# Patient Record
Sex: MALE | Race: WHITE | NOT HISPANIC OR LATINO | URBAN - METROPOLITAN AREA
[De-identification: names, ages, dates, MRNs, and addresses within clinical notes are randomized per-mention and may not be internally consistent; named-entity substitution may affect disease eponyms.]

---

## 2021-09-20 ENCOUNTER — EMERGENCY (EMERGENCY)
Facility: HOSPITAL | Age: 61
LOS: 0 days | Discharge: HOME | End: 2021-09-21
Attending: STUDENT IN AN ORGANIZED HEALTH CARE EDUCATION/TRAINING PROGRAM | Admitting: STUDENT IN AN ORGANIZED HEALTH CARE EDUCATION/TRAINING PROGRAM
Payer: MEDICAID

## 2021-09-20 VITALS
HEART RATE: 68 BPM | HEIGHT: 73 IN | OXYGEN SATURATION: 100 % | SYSTOLIC BLOOD PRESSURE: 190 MMHG | DIASTOLIC BLOOD PRESSURE: 81 MMHG | RESPIRATION RATE: 20 BRPM | TEMPERATURE: 98 F | WEIGHT: 203.93 LBS

## 2021-09-20 DIAGNOSIS — R06.02 SHORTNESS OF BREATH: ICD-10-CM
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
## 2021-09-20 DIAGNOSIS — R07.81 PLEURODYNIA: ICD-10-CM

## 2021-09-20 DIAGNOSIS — E11.9 TYPE 2 DIABETES MELLITUS WITHOUT COMPLICATIONS: ICD-10-CM
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
## 2021-09-20 DIAGNOSIS — Z87.442 PERSONAL HISTORY OF URINARY CALCULI: ICD-10-CM

## 2021-09-20 PROCEDURE — 99284 EMERGENCY DEPT VISIT MOD MDM: CPT

## 2021-09-20 RX ORDER — ACETAMINOPHEN 500 MG
650 TABLET ORAL ONCE
Refills: 0 | Status: COMPLETED | OUTPATIENT
Start: 2021-09-20 | End: 2021-09-20

## 2021-09-20 RX ADMIN — Medication 650 MILLIGRAM(S): at 22:42

## 2021-09-20 NOTE — ED ADULT TRIAGE NOTE - CHIEF COMPLAINT QUOTE
BIBA with complaints of slipped and fell, hit left rib area on the chair, did not hit head, no blood thinners

## 2021-09-21 PROCEDURE — 71250 CT THORAX DX C-: CPT | Mod: 26,MA

## 2021-09-21 PROCEDURE — 71045 X-RAY EXAM CHEST 1 VIEW: CPT | Mod: 26

## 2021-09-21 NOTE — ED PROVIDER NOTE - PHYSICAL EXAMINATION
CONSTITUTIONAL: Well-developed; well-nourished; in no acute distress. In pain, laying on the bed.  SKIN: warm, dry  HEAD: Normocephalic; atraumatic.  EYES: no conjunctival injection. PERRL.   ENT: No nasal discharge; airway clear.  NECK: Supple; non tender.  CARD: S1, S2 normal; no murmurs, gallops, or rubs. Regular rate and rhythm.   RESP: No wheezes, rales or rhonchi.  CHEST: Focal tenderness in left lower part of chest, no ecchymosis seen, no redness  ABD: soft ntnd  EXT: Normal ROM.  No clubbing, cyanosis or edema.   LYMPH: No acute cervical adenopathy.  NEURO: Alert, oriented, grossly unremarkable  PSYCH: Cooperative, appropriate.

## 2021-09-21 NOTE — ED PROVIDER NOTE - NS ED ROS FT
Review of Systems:  •	CONSTITUTIONAL - No fever, No diaphoresis, No weight change  •	SKIN - No rash  •	HEMATOLOGIC - No abnormal bleeding or bruising  •	EYES - No eye pain, No blurred vision  •	ENT - No change in hearing, No sore throat, No neck pain, No rhinorrhea, No ear pain  •	RESPIRATORY - +shortness of breath, No cough  •	CARDIAC -+ chest pain, No palpitations  •	GI - No abdominal pain, No nausea, No vomiting, No diarrhea, No constipation, No bright red blood per rectum or melena. No flank pain  •                 - No dysuria, frequency, hematuria.   •	ENDO - No polydypsia, No polyuria, No heat/cold intolerance  •	MUSCULOSKELETAL - No joint paint, No swelling, No back pain  •	NEUROLOGIC - No numbness, No focal weakness, No headache, No dizziness  All other systems negative, unless specified in HPI

## 2021-09-21 NOTE — ED PROVIDER NOTE - ATTENDING CONTRIBUTION TO CARE
60 yo m hx DM, kidney stones, no AC  pt states 2 hrs pta, he was trying to change lightbulb. pt was on a chair and as he was going down, he fell onto the back of the chair. pt did not fall off the chair to the ground. no head/neck injury. pt c/o pain to L anterior lower chest. pain worse w/ palpation and deep inspiration.  no preceding cp nor sob.      vss  gen- NAD, aaox3  card-rrr  Chest- L lower anterior chest w/ focal tenderness  lungs-ctab, no wheezing or rhonchi  abd-sntnd, no guarding or rebound  neuro- full str/sensation, cn ii-xii grossly intact, normal coordination and gait    cxr to r/o ptx, ct chest to eval for rib fx   will provide supportive care, serial exam and ED observation period

## 2021-09-21 NOTE — ED PROVIDER NOTE - CLINICAL SUMMARY MEDICAL DECISION MAKING FREE TEXT BOX
CT negative for fx. likely contusion. will rec pain control/nsaids, focus on deep inspiration, outpt f/u, return precautions

## 2021-09-21 NOTE — ED PROVIDER NOTE - PROGRESS NOTE DETAILS
MQ: CT shows no fracture, discussed incidental finding of pulmonary nodule to f/u with PMD for repeat CT chest in 1 year, pain slightly improved, advised OTC tylenol or ibuprofen for pain, will d/c

## 2021-09-21 NOTE — ED PROVIDER NOTE - OBJECTIVE STATEMENT
The patient is a 60 yo male with PMHx of DM and kidney stones c/o left rib pain since 2 hours ago. Patient was on a chair, changing the lightbulb of a chandelier, tried to get down, missed a step, and hit the left side of his chest on the top of the back of the chair. He had sudden onset, moderate, left lower chest pain, nonradiating, constant, worse with taking a deep breath. Associated SOB due to pain. Denies fall, head trauma, LOC, neck pain, back pain, blood thinner use. Denies fever, chills, cough, abdominal pain, n/v/d.

## 2021-09-21 NOTE — ED PROVIDER NOTE - PATIENT PORTAL LINK FT
You can access the FollowMyHealth Patient Portal offered by [facility] by registering at the following website: [email or web address]. By joining [employer]’s FollowMyHealth portal, you will also be able to view your health information using other applications (apps) compatible with our system.

## 2021-09-21 NOTE — ED PROVIDER NOTE - NSFOLLOWUPINSTRUCTIONS_ED_ALL_ED_FT
Chest Wall Pain    Chest wall pain is pain in or around the bones and muscles of your chest. Chest wall pain may be caused by:  •An injury.      •Coughing a lot.      •Using your chest and arm muscles too much.      Sometimes, the cause may not be known. This pain may take a few weeks or longer to get better.      Follow these instructions at home:      Managing pain, stiffness, and swelling   If told, put ice on the painful area:  •Put ice in a plastic bag.      •Place a towel between your skin and the bag.      •Leave the ice on for 20 minutes, 2–3 times a day.      Activity     •Rest as told by your doctor.      •Avoid doing things that cause pain. This includes lifting heavy items.      •Ask your doctor what activities are safe for you.        General instructions      •Take over-the-counter and prescription medicines only as told by your doctor.      • Do not use any products that contain nicotine or tobacco, such as cigarettes, e-cigarettes, and chewing tobacco. If you need help quitting, ask your doctor.      •Keep all follow-up visits as told by your doctor. This is important.        Contact a doctor if:    •You have a fever.      •Your chest pain gets worse.      •You have new symptoms.        Get help right away if:    •You feel sick to your stomach (nauseous) or you throw up (vomit).      •You feel sweaty or light-headed.      •You have a cough with mucus from your lungs (sputum) or you cough up blood.      •You are short of breath.      These symptoms may be an emergency. Do not wait to see if the symptoms will go away. Get medical help right away. Call your local emergency services (911 in the [place] Do not drive yourself to the hospital.       Summary    •Chest wall pain is pain in or around the bones and muscles of your chest.      •It may be treated with ice, rest, and medicines. Your condition may also get better if you avoid doing things that cause pain.      •Contact a doctor if you have a fever, chest pain that gets worse, or new symptoms.      •Get help right away if you feel light-headed or you get short of breath. These symptoms may be an emergency.      This information is not intended to replace advice given to you by your health care provider. Make sure you discuss any questions you have with your health care provider.    Please follow up with your primary care doctor in the next couple of days.

## 2022-04-18 PROBLEM — N20.0 CALCULUS OF KIDNEY: Chronic | Status: ACTIVE | Noted: 2021-09-21

## 2022-04-18 PROBLEM — E11.9 TYPE 2 DIABETES MELLITUS WITHOUT COMPLICATIONS: Chronic | Status: ACTIVE | Noted: 2021-09-21

## 2022-07-11 PROBLEM — Z00.00 ENCOUNTER FOR PREVENTIVE HEALTH EXAMINATION: Status: ACTIVE | Noted: 2022-07-11

## 2022-07-14 ENCOUNTER — APPOINTMENT (OUTPATIENT)
Dept: ENDOCRINOLOGY | Facility: CLINIC | Age: 62
End: 2022-07-14

## 2024-07-22 ENCOUNTER — EMERGENCY (EMERGENCY)
Facility: HOSPITAL | Age: 64
LOS: 0 days | Discharge: HOME | End: 2024-07-22
Attending: EMERGENCY MEDICINE
Payer: COMMERCIAL

## 2024-07-22 VITALS
DIASTOLIC BLOOD PRESSURE: 83 MMHG | WEIGHT: 209 LBS | SYSTOLIC BLOOD PRESSURE: 168 MMHG | HEIGHT: 73 IN | HEART RATE: 71 BPM | RESPIRATION RATE: 19 BRPM | OXYGEN SATURATION: 98 % | TEMPERATURE: 98 F

## 2024-07-22 VITALS — TEMPERATURE: 97 F

## 2024-07-22 DIAGNOSIS — M54.9 DORSALGIA, UNSPECIFIED: ICD-10-CM

## 2024-07-22 DIAGNOSIS — R10.9 UNSPECIFIED ABDOMINAL PAIN: ICD-10-CM

## 2024-07-22 DIAGNOSIS — Z88.0 ALLERGY STATUS TO PENICILLIN: ICD-10-CM

## 2024-07-22 DIAGNOSIS — Z87.442 PERSONAL HISTORY OF URINARY CALCULI: ICD-10-CM

## 2024-07-22 LAB
ALBUMIN SERPL ELPH-MCNC: 4.9 G/DL — SIGNIFICANT CHANGE UP (ref 3.5–5.2)
ALP SERPL-CCNC: 81 U/L — SIGNIFICANT CHANGE UP (ref 30–115)
ALT FLD-CCNC: 23 U/L — SIGNIFICANT CHANGE UP (ref 0–41)
ANION GAP SERPL CALC-SCNC: 13 MMOL/L — SIGNIFICANT CHANGE UP (ref 7–14)
APPEARANCE UR: CLEAR — SIGNIFICANT CHANGE UP
AST SERPL-CCNC: 31 U/L — SIGNIFICANT CHANGE UP (ref 0–41)
BASOPHILS # BLD AUTO: 0.03 K/UL — SIGNIFICANT CHANGE UP (ref 0–0.2)
BASOPHILS NFR BLD AUTO: 0.4 % — SIGNIFICANT CHANGE UP (ref 0–1)
BILIRUB DIRECT SERPL-MCNC: <0.2 MG/DL — SIGNIFICANT CHANGE UP (ref 0–0.3)
BILIRUB INDIRECT FLD-MCNC: >0.2 MG/DL — SIGNIFICANT CHANGE UP (ref 0.2–1.2)
BILIRUB SERPL-MCNC: 0.4 MG/DL — SIGNIFICANT CHANGE UP (ref 0.2–1.2)
BILIRUB UR-MCNC: NEGATIVE — SIGNIFICANT CHANGE UP
BUN SERPL-MCNC: 17 MG/DL — SIGNIFICANT CHANGE UP (ref 10–20)
CALCIUM SERPL-MCNC: 10.3 MG/DL — SIGNIFICANT CHANGE UP (ref 8.4–10.4)
CHLORIDE SERPL-SCNC: 101 MMOL/L — SIGNIFICANT CHANGE UP (ref 98–110)
CO2 SERPL-SCNC: 25 MMOL/L — SIGNIFICANT CHANGE UP (ref 17–32)
COLOR SPEC: YELLOW — SIGNIFICANT CHANGE UP
CREAT SERPL-MCNC: 0.9 MG/DL — SIGNIFICANT CHANGE UP (ref 0.7–1.5)
DIFF PNL FLD: NEGATIVE — SIGNIFICANT CHANGE UP
EGFR: 95 ML/MIN/1.73M2 — SIGNIFICANT CHANGE UP
EOSINOPHIL # BLD AUTO: 0.16 K/UL — SIGNIFICANT CHANGE UP (ref 0–0.7)
EOSINOPHIL NFR BLD AUTO: 2.2 % — SIGNIFICANT CHANGE UP (ref 0–8)
GLUCOSE SERPL-MCNC: 127 MG/DL — HIGH (ref 70–99)
GLUCOSE UR QL: >=1000 MG/DL
HCT VFR BLD CALC: 47.2 % — SIGNIFICANT CHANGE UP (ref 42–52)
HGB BLD-MCNC: 14.6 G/DL — SIGNIFICANT CHANGE UP (ref 14–18)
IMM GRANULOCYTES NFR BLD AUTO: 0.3 % — SIGNIFICANT CHANGE UP (ref 0.1–0.3)
KETONES UR-MCNC: 15 MG/DL
LEUKOCYTE ESTERASE UR-ACNC: NEGATIVE — SIGNIFICANT CHANGE UP
LIDOCAIN IGE QN: 37 U/L — SIGNIFICANT CHANGE UP (ref 7–60)
LYMPHOCYTES # BLD AUTO: 3.05 K/UL — SIGNIFICANT CHANGE UP (ref 1.2–3.4)
LYMPHOCYTES # BLD AUTO: 41 % — SIGNIFICANT CHANGE UP (ref 20.5–51.1)
MCHC RBC-ENTMCNC: 23.3 PG — LOW (ref 27–31)
MCHC RBC-ENTMCNC: 30.9 G/DL — LOW (ref 32–37)
MCV RBC AUTO: 75.4 FL — LOW (ref 80–94)
MONOCYTES # BLD AUTO: 0.89 K/UL — HIGH (ref 0.1–0.6)
MONOCYTES NFR BLD AUTO: 12 % — HIGH (ref 1.7–9.3)
NEUTROPHILS # BLD AUTO: 3.29 K/UL — SIGNIFICANT CHANGE UP (ref 1.4–6.5)
NEUTROPHILS NFR BLD AUTO: 44.1 % — SIGNIFICANT CHANGE UP (ref 42.2–75.2)
NITRITE UR-MCNC: NEGATIVE — SIGNIFICANT CHANGE UP
NRBC # BLD: 0 /100 WBCS — SIGNIFICANT CHANGE UP (ref 0–0)
PH UR: 6 — SIGNIFICANT CHANGE UP (ref 5–8)
PLATELET # BLD AUTO: 296 K/UL — SIGNIFICANT CHANGE UP (ref 130–400)
PMV BLD: 10.9 FL — HIGH (ref 7.4–10.4)
POTASSIUM SERPL-MCNC: 5.5 MMOL/L — HIGH (ref 3.5–5)
POTASSIUM SERPL-SCNC: 5.5 MMOL/L — HIGH (ref 3.5–5)
PROT SERPL-MCNC: 7.6 G/DL — SIGNIFICANT CHANGE UP (ref 6–8)
PROT UR-MCNC: NEGATIVE MG/DL — SIGNIFICANT CHANGE UP
RBC # BLD: 6.26 M/UL — HIGH (ref 4.7–6.1)
RBC # FLD: 15.9 % — HIGH (ref 11.5–14.5)
SODIUM SERPL-SCNC: 139 MMOL/L — SIGNIFICANT CHANGE UP (ref 135–146)
SP GR SPEC: 1.02 — SIGNIFICANT CHANGE UP (ref 1–1.03)
UROBILINOGEN FLD QL: 0.2 MG/DL — SIGNIFICANT CHANGE UP (ref 0.2–1)
WBC # BLD: 7.44 K/UL — SIGNIFICANT CHANGE UP (ref 4.8–10.8)
WBC # FLD AUTO: 7.44 K/UL — SIGNIFICANT CHANGE UP (ref 4.8–10.8)

## 2024-07-22 PROCEDURE — 83690 ASSAY OF LIPASE: CPT

## 2024-07-22 PROCEDURE — 80048 BASIC METABOLIC PNL TOTAL CA: CPT

## 2024-07-22 PROCEDURE — 81003 URINALYSIS AUTO W/O SCOPE: CPT

## 2024-07-22 PROCEDURE — 80076 HEPATIC FUNCTION PANEL: CPT

## 2024-07-22 PROCEDURE — 87086 URINE CULTURE/COLONY COUNT: CPT

## 2024-07-22 PROCEDURE — 74177 CT ABD & PELVIS W/CONTRAST: CPT | Mod: 26,MC

## 2024-07-22 PROCEDURE — 99285 EMERGENCY DEPT VISIT HI MDM: CPT

## 2024-07-22 PROCEDURE — 36415 COLL VENOUS BLD VENIPUNCTURE: CPT

## 2024-07-22 PROCEDURE — 85025 COMPLETE CBC W/AUTO DIFF WBC: CPT

## 2024-07-22 PROCEDURE — 96374 THER/PROPH/DIAG INJ IV PUSH: CPT | Mod: XU

## 2024-07-22 PROCEDURE — 99284 EMERGENCY DEPT VISIT MOD MDM: CPT | Mod: 25

## 2024-07-22 PROCEDURE — 74177 CT ABD & PELVIS W/CONTRAST: CPT | Mod: MC

## 2024-07-22 RX ORDER — KETOROLAC TROMETHAMINE 30 MG/ML
15 INJECTION, SOLUTION INTRAMUSCULAR ONCE
Refills: 0 | Status: DISCONTINUED | OUTPATIENT
Start: 2024-07-22 | End: 2024-07-22

## 2024-07-22 RX ORDER — DEXTROSE MONOHYDRATE AND SODIUM CHLORIDE 5; .3 G/100ML; G/100ML
1000 INJECTION, SOLUTION INTRAVENOUS ONCE
Refills: 0 | Status: COMPLETED | OUTPATIENT
Start: 2024-07-22 | End: 2024-07-22

## 2024-07-22 RX ADMIN — KETOROLAC TROMETHAMINE 15 MILLIGRAM(S): 30 INJECTION, SOLUTION INTRAMUSCULAR at 02:31

## 2024-07-22 RX ADMIN — DEXTROSE MONOHYDRATE AND SODIUM CHLORIDE 1000 MILLILITER(S): 5; .3 INJECTION, SOLUTION INTRAVENOUS at 02:31

## 2024-07-22 NOTE — ED PROVIDER NOTE - NSFOLLOWUPCLINICS_GEN_ALL_ED_FT
[facility] ([facility])  Rehabilitation  [facility] [address] [place] [address]  Phone: [phone]  Fax:   Follow Up Time: Urgent

## 2024-07-22 NOTE — ED PROVIDER NOTE - PHYSICAL EXAMINATION
Back: No swelling, no redness, no midline vertebral column tenderness, no saddle anesthesia, distally NVI.  Ext: B/L lower ext appears symmetrical, no swelling, no redness, no crepitus, no pain on ROM of the joints, no deformity, and are distally NVI.

## 2024-07-22 NOTE — ED PROVIDER NOTE - NSFOLLOWUPINSTRUCTIONS_ED_ALL_ED_FT
Back Pain    Back pain is very common in adults. The cause of back pain is rarely dangerous and the pain often gets better over time. The cause of your back pain may not be known and may include strain of muscles or ligaments, degeneration of the spinal disks, or arthritis. Occasionally the pain may radiate down your leg(s). Over-the-counter medicines to reduce pain and inflammation are often the most helpful. Stretching and remaining active frequently helps the healing process.     SEEK IMMEDIATE MEDICAL CARE IF YOU HAVE THE FOLLOWING SYMPTOMS: bowel or bladder control problems, unusual weakness or numbness in your arms or legs, nausea or vomiting, abdominal pain, fever, dizziness/lightheadedness.     Flank Pain, Adult  Flank pain is pain that is located on the side of the body between the upper abdomen and the back. This area is called the flank. The pain may occur over a short period of time (acute), or it may be long-term or recurring (chronic). It may be mild or severe. Flank pain can be caused by many things, including:    Muscle soreness or injury.  Kidney stones or kidney disease.  Stress.  A disease of the spine (vertebral disk disease).  A lung infection (pneumonia).  Fluid around the lungs (pulmonary edema).  A skin rash caused by the chickenpox virus (shingles).  Tumors that affect the back of the abdomen.  Gallbladder disease.    Follow these instructions at home:  Drink enough fluid to keep your urine clear or pale yellow.  Rest as told by your health care provider.  Take over-the-counter and prescription medicines only as told by your health care provider.  Keep a journal to track what has caused your flank pain and what has made it feel better.  ImageKeep all follow-up visits as told by your health care provider. This is important.  Contact a health care provider if:  Your pain is not controlled with medicine.  You have new symptoms.  Your pain gets worse.  You have a fever.  Your symptoms last longer than 2–3 days.  You have trouble urinating or you are urinating very frequently.  Get help right away if:  You have trouble breathing or you are short of breath.  Your abdomen hurts or it is swollen or red.  You have nausea or vomiting.  You feel faint or you pass out.  You have blood in your urine.  Summary  Flank pain is pain that is located on the side of the body between the upper abdomen and the back.  The pain may occur over a short period of time (acute), or it may be long-term or recurring (chronic). It may be mild or severe.  Flank pain can be caused by many things.  Contact your health care provider if your symptoms get worse or they last longer than 2–3 days.  This information is not intended to replace advice given to you by your health care provider. Make sure you discuss any questions you have with your health care provider.

## 2024-07-22 NOTE — ED ADULT NURSE NOTE - NSFALLUNIVINTERV_ED_ALL_ED
Monitor gait and stability/Monitor for mental status changes and reorient to person, place, and time, as needed/Bed/Stretcher in lowest position, wheels locked, appropriate side rails in place/Call bell, personal items and telephone in reach/Instruct patient to call for assistance before getting out of bed/chair/stretcher/Non-slip footwear applied when patient is off stretcher/[place] to call system/Physically safe environment - no spills, clutter or unnecessary equipment/Purposeful proactive rounding/Room/bathroom lighting operational, light cord in reach

## 2024-07-22 NOTE — ED PROVIDER NOTE - OBJECTIVE STATEMENT
Patient is c/o Flank pain x 2 days. No trauma. Patient with h/o kidney stones with similar symptoms in the past. Denies f/c/n/v/cp/sob. Denies testicular pain/scrotal pain. No rash. Denies HA/neck pain. Denies lower ext pain/paresthesias. Denies changes in bowel/bladder habits. Denies any neurologic symptoms. Patient is ambulatory in ED and is comfortable.

## 2024-07-22 NOTE — ED PROVIDER NOTE - DIFFERENTIAL DIAGNOSIS
Pancreatitis, hepatic failure, obstructive uropathy, diverticulitis, colitis, abscess, bowel obstruction, bowel perforation. Electrolyte abnormalities, JOHN PAUL, and GI bleeding. Differential Diagnosis

## 2024-07-23 LAB
CULTURE RESULTS: SIGNIFICANT CHANGE UP
SPECIMEN SOURCE: SIGNIFICANT CHANGE UP